# Patient Record
Sex: MALE | Race: WHITE | NOT HISPANIC OR LATINO | Employment: OTHER | URBAN - METROPOLITAN AREA
[De-identification: names, ages, dates, MRNs, and addresses within clinical notes are randomized per-mention and may not be internally consistent; named-entity substitution may affect disease eponyms.]

---

## 2017-01-01 ENCOUNTER — TELEPHONE (OUTPATIENT)
Dept: FAMILY MEDICINE | Facility: CLINIC | Age: 82
End: 2017-01-01

## 2017-01-01 ENCOUNTER — HOSPITAL ENCOUNTER (OUTPATIENT)
Dept: RADIOLOGY | Facility: HOSPITAL | Age: 82
Discharge: HOME | End: 2017-04-11
Attending: FAMILY MEDICINE
Payer: MEDICARE

## 2017-01-01 ENCOUNTER — PATIENT OUTREACH (OUTPATIENT)
Dept: ADMINISTRATIVE | Facility: HOSPITAL | Age: 82
End: 2017-01-01
Payer: MEDICARE

## 2017-01-01 ENCOUNTER — CLINICAL SUPPORT (OUTPATIENT)
Dept: FAMILY MEDICINE | Facility: CLINIC | Age: 82
End: 2017-01-01
Payer: MEDICARE

## 2017-01-01 ENCOUNTER — OFFICE VISIT (OUTPATIENT)
Dept: FAMILY MEDICINE | Facility: CLINIC | Age: 82
End: 2017-01-01
Payer: MEDICARE

## 2017-01-01 VITALS
SYSTOLIC BLOOD PRESSURE: 124 MMHG | TEMPERATURE: 97 F | DIASTOLIC BLOOD PRESSURE: 74 MMHG | BODY MASS INDEX: 18.41 KG/M2 | RESPIRATION RATE: 18 BRPM | HEIGHT: 68 IN | WEIGHT: 121.5 LBS | OXYGEN SATURATION: 97 % | HEART RATE: 84 BPM

## 2017-01-01 DIAGNOSIS — I51.89 DIASTOLIC DYSFUNCTION: ICD-10-CM

## 2017-01-01 DIAGNOSIS — I35.1 MODERATE AORTIC REGURGITATION: ICD-10-CM

## 2017-01-01 DIAGNOSIS — I35.0 MODERATE AORTIC STENOSIS: ICD-10-CM

## 2017-01-01 DIAGNOSIS — S09.90XA HEAD TRAUMA, INITIAL ENCOUNTER: Primary | ICD-10-CM

## 2017-01-01 DIAGNOSIS — Z86.73 HISTORY OF CVA (CEREBROVASCULAR ACCIDENT): ICD-10-CM

## 2017-01-01 DIAGNOSIS — Z23 NEED FOR PROPHYLACTIC VACCINATION AND INOCULATION AGAINST INFLUENZA: Primary | ICD-10-CM

## 2017-01-01 DIAGNOSIS — S09.90XA HEAD TRAUMA, INITIAL ENCOUNTER: ICD-10-CM

## 2017-01-01 DIAGNOSIS — E78.5 HYPERLIPIDEMIA, UNSPECIFIED HYPERLIPIDEMIA TYPE: ICD-10-CM

## 2017-01-01 DIAGNOSIS — I10 ESSENTIAL HYPERTENSION: ICD-10-CM

## 2017-01-01 DIAGNOSIS — R63.4 WEIGHT LOSS: Primary | ICD-10-CM

## 2017-01-01 LAB
ALBUMIN SERPL BCP-MCNC: 3.7 G/DL
ALP SERPL-CCNC: 84 U/L
ALT SERPL W/O P-5'-P-CCNC: 19 U/L
ANION GAP SERPL CALC-SCNC: 11 MMOL/L
AST SERPL-CCNC: 29 U/L
BASOPHILS # BLD AUTO: 0.01 K/UL
BASOPHILS NFR BLD: 0.1 %
BILIRUB SERPL-MCNC: 0.4 MG/DL
BUN SERPL-MCNC: 34 MG/DL
CALCIUM SERPL-MCNC: 9.5 MG/DL
CHLORIDE SERPL-SCNC: 107 MMOL/L
CO2 SERPL-SCNC: 24 MMOL/L
CREAT SERPL-MCNC: 1.3 MG/DL
DIFFERENTIAL METHOD: ABNORMAL
EOSINOPHIL # BLD AUTO: 0.1 K/UL
EOSINOPHIL NFR BLD: 2 %
ERYTHROCYTE [DISTWIDTH] IN BLOOD BY AUTOMATED COUNT: 14 %
EST. GFR  (AFRICAN AMERICAN): 53.6 ML/MIN/1.73 M^2
EST. GFR  (NON AFRICAN AMERICAN): 46.4 ML/MIN/1.73 M^2
GLUCOSE SERPL-MCNC: 86 MG/DL
HCT VFR BLD AUTO: 34.1 %
HGB BLD-MCNC: 11.4 G/DL
LYMPHOCYTES # BLD AUTO: 0.8 K/UL
LYMPHOCYTES NFR BLD: 11.5 %
MCH RBC QN AUTO: 32.1 PG
MCHC RBC AUTO-ENTMCNC: 33.4 %
MCV RBC AUTO: 96 FL
MONOCYTES # BLD AUTO: 0.7 K/UL
MONOCYTES NFR BLD: 9.5 %
NEUTROPHILS # BLD AUTO: 5.2 K/UL
NEUTROPHILS NFR BLD: 75.9 %
PLATELET # BLD AUTO: 216 K/UL
PMV BLD AUTO: 10.3 FL
POTASSIUM SERPL-SCNC: 5 MMOL/L
PREALB SERPL-MCNC: 21 MG/DL
PROT SERPL-MCNC: 6.6 G/DL
RBC # BLD AUTO: 3.55 M/UL
SODIUM SERPL-SCNC: 142 MMOL/L
TSH SERPL DL<=0.005 MIU/L-ACNC: 1.41 UIU/ML
WBC # BLD AUTO: 6.86 K/UL

## 2017-01-01 PROCEDURE — G0008 ADMIN INFLUENZA VIRUS VAC: HCPCS | Mod: S$GLB,,, | Performed by: FAMILY MEDICINE

## 2017-01-01 PROCEDURE — 85025 COMPLETE CBC W/AUTO DIFF WBC: CPT

## 2017-01-01 PROCEDURE — 84443 ASSAY THYROID STIM HORMONE: CPT

## 2017-01-01 PROCEDURE — 90662 IIV NO PRSV INCREASED AG IM: CPT | Mod: S$GLB,,, | Performed by: FAMILY MEDICINE

## 2017-01-01 PROCEDURE — 84134 ASSAY OF PREALBUMIN: CPT

## 2017-01-01 PROCEDURE — 1125F AMNT PAIN NOTED PAIN PRSNT: CPT | Mod: S$GLB,,, | Performed by: FAMILY MEDICINE

## 2017-01-01 PROCEDURE — 99213 OFFICE O/P EST LOW 20 MIN: CPT | Mod: S$GLB,,, | Performed by: FAMILY MEDICINE

## 2017-01-01 PROCEDURE — 70450 CT HEAD/BRAIN W/O DYE: CPT | Mod: TC,PO

## 2017-01-01 PROCEDURE — 80053 COMPREHEN METABOLIC PANEL: CPT

## 2017-01-01 RX ORDER — ALPRAZOLAM 0.25 MG/1
0.12 TABLET ORAL NIGHTLY PRN
Qty: 30 TABLET | Refills: 1 | Status: SHIPPED | OUTPATIENT
Start: 2017-01-01 | End: 2017-01-01 | Stop reason: SDUPTHER

## 2017-01-01 RX ORDER — ALPRAZOLAM 0.25 MG/1
TABLET ORAL
Qty: 30 TABLET | Refills: 0 | OUTPATIENT
Start: 2017-01-01

## 2017-01-01 RX ORDER — ALPRAZOLAM 0.25 MG/1
0.12 TABLET ORAL NIGHTLY PRN
Qty: 45 TABLET | Refills: 1 | Status: SHIPPED | OUTPATIENT
Start: 2017-01-01 | End: 2018-01-01 | Stop reason: SDUPTHER

## 2017-01-01 RX ORDER — PENICILLIN V POTASSIUM 500 MG/1
500 TABLET, FILM COATED ORAL 3 TIMES DAILY
Qty: 30 TABLET | Refills: 0 | Status: SHIPPED | OUTPATIENT
Start: 2017-01-01 | End: 2018-01-01

## 2017-01-01 RX ORDER — LEVOTHYROXINE SODIUM 88 UG/1
88 TABLET ORAL DAILY
Qty: 90 TABLET | Refills: 3 | Status: SHIPPED | OUTPATIENT
Start: 2017-01-01 | End: 2018-01-01 | Stop reason: SDUPTHER

## 2017-01-01 RX ORDER — SIMVASTATIN 40 MG/1
TABLET, FILM COATED ORAL
Qty: 90 TABLET | Refills: 3 | Status: SHIPPED | OUTPATIENT
Start: 2017-01-01

## 2017-01-01 RX ORDER — PENICILLIN V POTASSIUM 500 MG/1
500 TABLET, FILM COATED ORAL EVERY 8 HOURS
Qty: 30 TABLET | Refills: 0 | Status: SHIPPED | OUTPATIENT
Start: 2017-01-01 | End: 2017-01-01

## 2017-01-08 RX ORDER — IRBESARTAN 300 MG/1
300 TABLET ORAL DAILY
Qty: 90 TABLET | Refills: 3 | Status: SHIPPED | OUTPATIENT
Start: 2017-01-08 | End: 2018-01-01 | Stop reason: SDUPTHER

## 2017-01-10 ENCOUNTER — TELEPHONE (OUTPATIENT)
Dept: FAMILY MEDICINE | Facility: CLINIC | Age: 82
End: 2017-01-10

## 2017-01-10 RX ORDER — CLOPIDOGREL BISULFATE 75 MG/1
75 TABLET ORAL DAILY
Qty: 90 TABLET | Refills: 3 | Status: SHIPPED | OUTPATIENT
Start: 2017-01-10 | End: 2018-01-01

## 2017-01-13 ENCOUNTER — TELEPHONE (OUTPATIENT)
Dept: FAMILY MEDICINE | Facility: CLINIC | Age: 82
End: 2017-01-13

## 2017-01-13 DIAGNOSIS — R13.14 PHARYNGOESOPHAGEAL DYSPHAGIA: Primary | ICD-10-CM

## 2017-01-14 NOTE — TELEPHONE ENCOUNTER
Pt called me Friday PM; unable to swallow big pills suddenly and food; no vomiting, no pain;    Needs esophogram; ordered; pt told to be PARESH KELLEY Sunday PM and to call me so we can get this done Monday.  Will drink ensure and eat soft foods only until then.

## 2017-02-12 RX ORDER — DILTIAZEM HYDROCHLORIDE 120 MG/1
CAPSULE, COATED, EXTENDED RELEASE ORAL
Qty: 180 CAPSULE | Refills: 3 | Status: SHIPPED | OUTPATIENT
Start: 2017-02-12

## 2017-04-11 NOTE — TELEPHONE ENCOUNTER
Call pt at 8 AM and get for Tuesday AM ASAP so his son in law Sterling can bring him.  Pt fell Monday PM at home. I saw him at 8 pm; hematoma back of head; hit head on concrete.

## 2017-04-11 NOTE — TELEPHONE ENCOUNTER
----- Message from John Paul Sargent MD sent at 4/11/2017 10:17 AM CDT -----  i called pt and notified him it was normal; he will notify his children

## 2017-04-17 NOTE — MR AVS SNAPSHOT
[facility]  [address] [place] [address]  [place] [address]  Phone: [phone]  Fax: [phone]                  Mika Domingo   2017 11:20 AM   Office Visit    Description:  Male : 9/3/1921   Provider:  John Paul Sargent MD   Department:  [facility]           Reason for Visit     Follow-up           Diagnoses this Visit        Comments    Weight loss    -  Primary     Moderate aortic stenosis         Moderate aortic regurgitation         Diastolic dysfunction         History of CVA (cerebrovascular accident)         Hyperlipidemia, unspecified hyperlipidemia type         Essential hypertension                To Do List           Goals (5 Years of Data)     None      Follow-Up and Disposition     Return in about 6 months (around 10/17/2017).      [facility]     [facility] Nurse Care Line -  Assistance  Unless otherwise directed by your provider, please contact [facility], our nurse care line that is available for  assistance.     Registered nurses in the [facility] Center provide: appointment scheduling, clinical advisement, health education, and other advisory services.  Call: [phone] (toll free)               Medications           Message regarding Medications     Verify the changes and/or additions to your medication regime listed below are the same as discussed with your clinician today.  If any of these changes or additions are incorrect, please notify your healthcare provider.             Verify that the below list of medications is an accurate representation of the medications you are currently taking.  If none reported, the list may be blank. If incorrect, please contact your healthcare provider. Carry this list with you in case of emergency.           Current Medications     alprazolam (XANAX) 0.25 MG tablet Take 0.5 tablets (0.125 mg total) by mouth nightly as needed for Anxiety. One at night as needed for sleep    clopidogrel (PLAVIX) 75  "mg tablet Take 1 tablet (75 mg total) by mouth once daily.    diltiaZEM (CARDIZEM CD) 120 MG Cp24 TAKE 1 CAPSULE TWICE DAILY    irbesartan (AVAPRO) 300 MG tablet Take 1 tablet (300 mg total) by mouth once daily.    ISTALOL 0.5 % DrpD     latanoprost 0.005 % ophthalmic solution     levothyroxine (SYNTHROID) 88 MCG tablet TAKE 1 TABLET ONCE DAILY    ramelteon (ROZEREM) 8 mg tablet Take 1 tablet (8 mg total) by mouth every evening.    simvastatin (ZOCOR) 40 MG tablet Take 1 tablet (40 mg total) by mouth nightly.           Clinical Reference Information           Your Vitals Were     BP Pulse Temp Resp Height Weight    124/74 (BP Location: Left arm, Patient Position: Sitting, BP Method: Manual) 84 97 °F (36.1 °C) (Oral) 18 5' 8" (1.727 m) 55.1 kg (121 lb 7.6 oz)    SpO2 BMI             97% 18.47 kg/m2         Blood Pressure          Most Recent Value    BP  124/74      Allergies as of 4/17/2017     No Known Allergies      Immunizations Administered on Date of Encounter - 4/17/2017     None      Orders Placed During Today's Visit      Normal Orders This Visit    CBC auto differential     Comprehensive metabolic panel     PREALBUMIN     TSH     Future Labs/Procedures Expected by Expires    CBC auto differential  4/17/2017 4/17/2017    Comprehensive metabolic panel  4/17/2017 4/17/2017    PREALBUMIN  4/17/2017 4/17/2017    TSH  As directed 4/17/2017      [facility] Sign-Up     Activating your [facility] account is as easy as 1-2-3!     1) Visit [email or web address], select Sign Up Now, enter this activation code and your date of birth, then select Next.  [email or web address]  Expires: 6/7/2017  6:55 PM      2) Create a username and password to use when you visit [facility] in the future and select a security question in case you lose your password and select Next.    3) Enter your e-mail address and click Sign Up!    Additional Information  If you have questions, please e-mail [email or web address] or call [phone] to talk to our " [facility] staff. Remember, [facility] is NOT to be used for urgent needs. For medical emergencies, dial 911.         Language Assistance Services     ATTENTION: Language assistance services are available, free of charge. Please call [phone].      ATENCIÓN: Si habla mildred, tiene a copeland disposición servicios gratuitos de asistencia lingüística. Llame al [phone].     CHÚ Ý: N?u b?n nói Ti?ng Vi?t, có các d?ch v? h? tr? ngôn ng? mi?n phí dành cho b?n. G?i s? [phone].         [facility] complies with applicable Federal civil rights laws and does not discriminate on the basis of race, color, national origin, age, disability, or sex.

## 2017-04-17 NOTE — PROGRESS NOTES
Subjective:      Patient ID: Mika Domingo is a 89 y.o. male.    Chief Complaint: Follow-up    HPI Comments: Wellness exam; fell last week; at home with ex step son; driving still; has vision issues; trouble sleeping; losing weight since wife ;     Review of Systems   Constitutional: Negative for appetite change, fatigue, fever and unexpected weight change.   HENT: Negative for congestion, ear pain, sinus pressure and sore throat.    Eyes: Negative for pain and visual disturbance.   Respiratory: Negative for shortness of breath.    Cardiovascular: Negative for chest pain.   Gastrointestinal: Negative for abdominal pain, constipation and diarrhea.   Endocrine: Negative for polyuria.   Genitourinary: Negative for difficulty urinating and frequency.   Musculoskeletal: Positive for gait problem and neck stiffness. Negative for arthralgias, back pain and myalgias.   Skin: Negative for color change.   Allergic/Immunologic: Negative.    Neurological: Positive for weakness. Negative for syncope and headaches.   Hematological: Does not bruise/bleed easily.   Psychiatric/Behavioral: Negative for dysphoric mood and suicidal ideas. The patient is not nervous/anxious.    All other systems reviewed and are negative.    Objective:     Physical Exam   Constitutional: He is oriented to person, place, and time. He appears well-developed and well-nourished. No distress.   HENT:   Head: Normocephalic and atraumatic.   Right Ear: External ear normal.   Left Ear: External ear normal.   Mouth/Throat: Oropharynx is clear and moist. No oropharyngeal exudate.   [place]   Eyes: Conjunctivae and EOM are normal. Pupils are equal, round, and reactive to light. Right eye exhibits no discharge. Left eye exhibits no discharge. No scleral icterus.   Neck: Normal range of motion. Neck supple. No JVD present. No tracheal deviation present. No thyromegaly present.   Cardiovascular: Normal rate and regular rhythm.  Exam reveals no gallop and no friction  rub.    Murmur heard.  Pulmonary/Chest: Effort normal and breath sounds normal. No stridor. No respiratory distress. He has no wheezes. He has no rales. He exhibits no tenderness.   Abdominal: Soft. He exhibits no distension and no mass. There is no tenderness. There is no rebound and no guarding.   Musculoskeletal: He exhibits deformity. He exhibits no edema or tenderness.   scoliosis   Lymphadenopathy:     He has no cervical adenopathy.   Neurological: He is alert and oriented to person, place, and time. He has normal reflexes. He displays normal reflexes. No cranial nerve deficit. He exhibits normal muscle tone. Coordination normal.   Skin: Skin is warm and dry. No rash noted. He is not diaphoretic. No erythema. No pallor.   Psychiatric: He has a normal mood and affect. His behavior is normal. Judgment and thought content normal.   Nursing note and vitals reviewed.    Assessment:     1. Weight loss    2. Moderate aortic stenosis    3. Moderate aortic regurgitation    4. Diastolic dysfunction      Plan:     New Prescriptions    No medications on file     Discontinued Medications    No medications on file     Modified Medications    No medications on file       Weight loss  -     CBC auto differential; Future; Expected date: 4/17/17  -     Comprehensive metabolic panel; Future; Expected date: 4/17/17  -     TSH; Future  -     PREALBUMIN; Future; Expected date: 4/17/17    Moderate aortic stenosis  -     Cancel: 2D Echo w/ Color Flow Doppler    Moderate aortic regurgitation  -     Cancel: 2D Echo w/ Color Flow Doppler    Diastolic dysfunction  -     Cancel: 2D Echo w/ Color Flow Doppler

## 2017-04-23 PROBLEM — I51.89 DIASTOLIC DYSFUNCTION: Status: ACTIVE | Noted: 2017-01-01

## 2017-04-23 PROBLEM — I10 ESSENTIAL HYPERTENSION: Status: ACTIVE | Noted: 2017-01-01

## 2017-04-23 PROBLEM — R63.4 WEIGHT LOSS: Status: ACTIVE | Noted: 2017-01-01

## 2017-04-23 PROBLEM — I35.0 MODERATE AORTIC STENOSIS: Status: ACTIVE | Noted: 2017-01-01

## 2017-04-23 PROBLEM — I35.1 MODERATE AORTIC REGURGITATION: Status: ACTIVE | Noted: 2017-01-01

## 2017-06-02 NOTE — TELEPHONE ENCOUNTER
Please send to [facility] in [place]     alprazolam (XANAX) 0.25 MG tablet [Pharmacy Med Name: ALPRAZolam 0.25MG   TAB]  TAKE ONE TABLET BY MOUTH AT NIGHT AS NEEDED FOR SLEEP   Normal, Disp-30 tablet, R-0

## 2017-06-30 NOTE — TELEPHONE ENCOUNTER
----- Message from Lore Mendieta sent at 6/30/2017 11:08 AM CDT -----  Contact: Pt [phone]  Patient states he would like a call from Dr. Sargent's nurse. Patient stated he didn't want to leave any other details. Please call

## 2017-11-17 NOTE — TELEPHONE ENCOUNTER
----- Message from Sangeetha Kingsley sent at 11/17/2017  9:08 AM CST -----  Patient would like a returned call from Dr Sargent. Says he lost John Paul's cell #

## 2018-01-01 ENCOUNTER — OFFICE VISIT (OUTPATIENT)
Dept: FAMILY MEDICINE | Facility: CLINIC | Age: 83
End: 2018-01-01
Payer: MEDICARE

## 2018-01-01 ENCOUNTER — HOSPITAL ENCOUNTER (EMERGENCY)
Facility: HOSPITAL | Age: 83
Discharge: HOME | End: 2018-01-12
Attending: EMERGENCY MEDICINE
Payer: MEDICARE

## 2018-01-01 ENCOUNTER — TELEPHONE (OUTPATIENT)
Dept: FAMILY MEDICINE | Facility: CLINIC | Age: 83
End: 2018-01-01

## 2018-01-01 ENCOUNTER — HOSPITAL ENCOUNTER (OUTPATIENT)
Facility: HOSPITAL | Age: 83
End: 2018-04-03
Attending: EMERGENCY MEDICINE | Admitting: HOSPITALIST
Payer: MEDICARE

## 2018-01-01 ENCOUNTER — CLINICAL SUPPORT (OUTPATIENT)
Dept: FAMILY MEDICINE | Facility: CLINIC | Age: 83
End: 2018-01-01
Payer: MEDICARE

## 2018-01-01 ENCOUNTER — HOSPITAL ENCOUNTER (EMERGENCY)
Facility: HOSPITAL | Age: 83
Discharge: HOME | End: 2018-03-21
Payer: MEDICARE

## 2018-01-01 VITALS
TEMPERATURE: 98 F | DIASTOLIC BLOOD PRESSURE: 59 MMHG | SYSTOLIC BLOOD PRESSURE: 132 MMHG | HEART RATE: 93 BPM | WEIGHT: 124.69 LBS | BODY MASS INDEX: 18.9 KG/M2 | OXYGEN SATURATION: 99 % | HEIGHT: 68 IN | RESPIRATION RATE: 16 BRPM

## 2018-01-01 VITALS
BODY MASS INDEX: 23.23 KG/M2 | DIASTOLIC BLOOD PRESSURE: 71 MMHG | RESPIRATION RATE: 19 BRPM | WEIGHT: 148 LBS | OXYGEN SATURATION: 96 % | SYSTOLIC BLOOD PRESSURE: 143 MMHG | HEART RATE: 95 BPM | TEMPERATURE: 98 F | HEIGHT: 67 IN

## 2018-01-01 VITALS
RESPIRATION RATE: 18 BRPM | WEIGHT: 135 LBS | HEART RATE: 70 BPM | DIASTOLIC BLOOD PRESSURE: 60 MMHG | TEMPERATURE: 98 F | SYSTOLIC BLOOD PRESSURE: 138 MMHG | BODY MASS INDEX: 21.14 KG/M2 | OXYGEN SATURATION: 99 %

## 2018-01-01 VITALS
BODY MASS INDEX: 20.22 KG/M2 | SYSTOLIC BLOOD PRESSURE: 94 MMHG | OXYGEN SATURATION: 98 % | DIASTOLIC BLOOD PRESSURE: 60 MMHG | HEART RATE: 67 BPM | HEIGHT: 67 IN | TEMPERATURE: 98 F | WEIGHT: 128.81 LBS

## 2018-01-01 DIAGNOSIS — R53.83 FATIGUE: ICD-10-CM

## 2018-01-01 DIAGNOSIS — I51.89 DIASTOLIC DYSFUNCTION: ICD-10-CM

## 2018-01-01 DIAGNOSIS — I35.0 MODERATE AORTIC STENOSIS: ICD-10-CM

## 2018-01-01 DIAGNOSIS — D64.9 ANEMIA, UNSPECIFIED TYPE: ICD-10-CM

## 2018-01-01 DIAGNOSIS — I95.2 HYPOTENSION DUE TO DRUGS: ICD-10-CM

## 2018-01-01 DIAGNOSIS — I10 ESSENTIAL HYPERTENSION: ICD-10-CM

## 2018-01-01 DIAGNOSIS — K92.2 LOWER GI BLEED: ICD-10-CM

## 2018-01-01 DIAGNOSIS — D53.9 MACROCYTIC ANEMIA: ICD-10-CM

## 2018-01-01 DIAGNOSIS — S01.01XD LACERATION OF SCALP, SUBSEQUENT ENCOUNTER: Primary | ICD-10-CM

## 2018-01-01 DIAGNOSIS — S01.03XA PUNCTURE WOUND OF SCALP, INITIAL ENCOUNTER: Primary | ICD-10-CM

## 2018-01-01 DIAGNOSIS — E86.0 DEHYDRATION: ICD-10-CM

## 2018-01-01 DIAGNOSIS — R52 PAIN: ICD-10-CM

## 2018-01-01 DIAGNOSIS — R09.02 HYPOXIA: Primary | ICD-10-CM

## 2018-01-01 DIAGNOSIS — E53.8 B12 DEFICIENCY: Primary | ICD-10-CM

## 2018-01-01 DIAGNOSIS — R79.9 ELEVATED BUN: ICD-10-CM

## 2018-01-01 DIAGNOSIS — D53.9 MACROCYTIC ANEMIA: Primary | ICD-10-CM

## 2018-01-01 DIAGNOSIS — R10.31 RLQ DISCOMFORT: Primary | ICD-10-CM

## 2018-01-01 DIAGNOSIS — N17.9 AKI (ACUTE KIDNEY INJURY): ICD-10-CM

## 2018-01-01 LAB
ABO + RH BLD: NORMAL
ALBUMIN SERPL BCP-MCNC: 3.3 G/DL
ALBUMIN SERPL BCP-MCNC: 3.4 G/DL
ALP SERPL-CCNC: 83 U/L
ALP SERPL-CCNC: 91 U/L
ALT SERPL W/O P-5'-P-CCNC: 13 U/L
ALT SERPL W/O P-5'-P-CCNC: 18 U/L
ANION GAP SERPL CALC-SCNC: 11 MMOL/L
ANION GAP SERPL CALC-SCNC: 12 MMOL/L
ANION GAP SERPL CALC-SCNC: 6 MMOL/L
ANISOCYTOSIS BLD QL SMEAR: SLIGHT
AST SERPL-CCNC: 23 U/L
AST SERPL-CCNC: 28 U/L
BASOPHILS # BLD AUTO: 0.02 K/UL
BASOPHILS NFR BLD: 0 %
BASOPHILS NFR BLD: 0.3 %
BILIRUB SERPL-MCNC: 0.3 MG/DL
BILIRUB SERPL-MCNC: 0.4 MG/DL
BILIRUB UR QL STRIP: NEGATIVE
BLD GP AB SCN CELLS X3 SERPL QL: NORMAL
BNP SERPL-MCNC: 400 PG/ML
BUN SERPL-MCNC: 32 MG/DL
BUN SERPL-MCNC: 41 MG/DL
BUN SERPL-MCNC: 54 MG/DL
CALCIUM SERPL-MCNC: 8.8 MG/DL
CALCIUM SERPL-MCNC: 9 MG/DL
CALCIUM SERPL-MCNC: 9.5 MG/DL
CHLORIDE SERPL-SCNC: 109 MMOL/L
CHLORIDE SERPL-SCNC: 110 MMOL/L
CHLORIDE SERPL-SCNC: 111 MMOL/L
CLARITY UR REFRACT.AUTO: CLEAR
CO2 SERPL-SCNC: 21 MMOL/L
CO2 SERPL-SCNC: 21 MMOL/L
CO2 SERPL-SCNC: 27 MMOL/L
COLOR UR AUTO: YELLOW
CREAT SERPL-MCNC: 1.4 MG/DL
CREAT SERPL-MCNC: 1.8 MG/DL
CREAT SERPL-MCNC: 2.1 MG/DL
DIFFERENTIAL METHOD: ABNORMAL
DIFFERENTIAL METHOD: ABNORMAL
EOSINOPHIL # BLD AUTO: 0.3 K/UL
EOSINOPHIL NFR BLD: 0 %
EOSINOPHIL NFR BLD: 4.4 %
ERYTHROCYTE [DISTWIDTH] IN BLOOD BY AUTOMATED COUNT: 15.8 %
ERYTHROCYTE [DISTWIDTH] IN BLOOD BY AUTOMATED COUNT: 16.2 %
ERYTHROCYTE [DISTWIDTH] IN BLOOD BY AUTOMATED COUNT: 16.6 %
EST. GFR  (AFRICAN AMERICAN): 30 ML/MIN/1.73 M^2
EST. GFR  (AFRICAN AMERICAN): 36 ML/MIN/1.73 M^2
EST. GFR  (AFRICAN AMERICAN): 48.7 ML/MIN/1.73 M^2
EST. GFR  (NON AFRICAN AMERICAN): 26 ML/MIN/1.73 M^2
EST. GFR  (NON AFRICAN AMERICAN): 31 ML/MIN/1.73 M^2
EST. GFR  (NON AFRICAN AMERICAN): 42.1 ML/MIN/1.73 M^2
FERRITIN SERPL-MCNC: 58 NG/ML
FERRITIN SERPL-MCNC: 71 NG/ML
FOLATE SERPL-MCNC: 14.2 NG/ML
GLUCOSE SERPL-MCNC: 111 MG/DL
GLUCOSE SERPL-MCNC: 54 MG/DL
GLUCOSE SERPL-MCNC: 91 MG/DL
GLUCOSE UR QL STRIP: NEGATIVE
HCT VFR BLD AUTO: 29.1 %
HCT VFR BLD AUTO: 30.3 %
HCT VFR BLD AUTO: 31.8 %
HGB BLD-MCNC: 8.7 G/DL
HGB BLD-MCNC: 9.5 G/DL
HGB BLD-MCNC: 9.9 G/DL
HGB UR QL STRIP: NEGATIVE
HYPOCHROMIA BLD QL SMEAR: ABNORMAL
IMM GRANULOCYTES # BLD AUTO: 0.12 K/UL
IMM GRANULOCYTES NFR BLD AUTO: 1.7 %
INR PPP: 1
IRON SERPL-MCNC: 20 UG/DL
IRON SERPL-MCNC: 53 UG/DL
KETONES UR QL STRIP: NEGATIVE
LEUKOCYTE ESTERASE UR QL STRIP: NEGATIVE
LYMPHOCYTES # BLD AUTO: 0.7 K/UL
LYMPHOCYTES NFR BLD: 9 %
LYMPHOCYTES NFR BLD: 9.4 %
MCH RBC QN AUTO: 30.5 PG
MCH RBC QN AUTO: 30.6 PG
MCH RBC QN AUTO: 31.1 PG
MCHC RBC AUTO-ENTMCNC: 29.9 G/DL
MCHC RBC AUTO-ENTMCNC: 31.1 G/DL
MCHC RBC AUTO-ENTMCNC: 31.4 G/DL
MCV RBC AUTO: 104 FL
MCV RBC AUTO: 97 FL
MCV RBC AUTO: 98 FL
METAMYELOCYTES NFR BLD MANUAL: 3 %
MONOCYTES # BLD AUTO: 0.6 K/UL
MONOCYTES NFR BLD: 2 %
MONOCYTES NFR BLD: 8 %
MYELOCYTES NFR BLD MANUAL: 2 %
NEUTROPHILS # BLD AUTO: 5.4 K/UL
NEUTROPHILS NFR BLD: 76.2 %
NEUTROPHILS NFR BLD: 84 %
NITRITE UR QL STRIP: NEGATIVE
NRBC BLD-RTO: 0 /100 WBC
OB PNL STL: POSITIVE
OVALOCYTES BLD QL SMEAR: ABNORMAL
PH UR STRIP: 6 [PH] (ref 5–8)
PLATELET # BLD AUTO: 196 K/UL
PLATELET # BLD AUTO: 209 K/UL
PLATELET # BLD AUTO: 228 K/UL
PLATELET BLD QL SMEAR: ABNORMAL
PMV BLD AUTO: 10.2 FL
PMV BLD AUTO: 9.5 FL
PMV BLD AUTO: 9.9 FL
POIKILOCYTOSIS BLD QL SMEAR: SLIGHT
POLYCHROMASIA BLD QL SMEAR: ABNORMAL
POTASSIUM SERPL-SCNC: 5 MMOL/L
POTASSIUM SERPL-SCNC: 5.2 MMOL/L
POTASSIUM SERPL-SCNC: 5.2 MMOL/L
PROT SERPL-MCNC: 6 G/DL
PROT SERPL-MCNC: 6 G/DL
PROT UR QL STRIP: NEGATIVE
PROTHROMBIN TIME: 10.6 SEC
RBC # BLD AUTO: 2.8 M/UL
RBC # BLD AUTO: 3.11 M/UL
RBC # BLD AUTO: 3.24 M/UL
RETICS/RBC NFR AUTO: 1.8 %
SATURATED IRON: 7 %
SODIUM SERPL-SCNC: 142 MMOL/L
SODIUM SERPL-SCNC: 142 MMOL/L
SODIUM SERPL-SCNC: 144 MMOL/L
SP GR UR STRIP: 1.01 (ref 1–1.03)
T4 FREE SERPL-MCNC: 0.79 NG/DL
TOTAL IRON BINDING CAPACITY: 299 UG/DL
TRANSFERRIN SERPL-MCNC: 202 MG/DL
TROPONIN I SERPL DL<=0.01 NG/ML-MCNC: 0.03 NG/ML
TSH SERPL DL<=0.005 MIU/L-ACNC: 4.17 UIU/ML
URN SPEC COLLECT METH UR: NORMAL
UROBILINOGEN UR STRIP-ACNC: NEGATIVE EU/DL
VIT B12 SERPL-MCNC: 181 PG/ML
WBC # BLD AUTO: 7.02 K/UL
WBC # BLD AUTO: 7.53 K/UL
WBC # BLD AUTO: 7.82 K/UL

## 2018-01-01 PROCEDURE — 83540 ASSAY OF IRON: CPT

## 2018-01-01 PROCEDURE — 99284 EMERGENCY DEPT VISIT MOD MDM: CPT

## 2018-01-01 PROCEDURE — C9113 INJ PANTOPRAZOLE SODIUM, VIA: HCPCS | Performed by: PHYSICIAN ASSISTANT

## 2018-01-01 PROCEDURE — 82607 VITAMIN B-12: CPT

## 2018-01-01 PROCEDURE — 82272 OCCULT BLD FECES 1-3 TESTS: CPT

## 2018-01-01 PROCEDURE — 25000003 PHARM REV CODE 250: Performed by: HOSPITALIST

## 2018-01-01 PROCEDURE — G0378 HOSPITAL OBSERVATION PER HR: HCPCS

## 2018-01-01 PROCEDURE — 99284 EMERGENCY DEPT VISIT MOD MDM: CPT | Mod: 25

## 2018-01-01 PROCEDURE — 84484 ASSAY OF TROPONIN QUANT: CPT

## 2018-01-01 PROCEDURE — 82728 ASSAY OF FERRITIN: CPT

## 2018-01-01 PROCEDURE — 99213 OFFICE O/P EST LOW 20 MIN: CPT | Mod: S$GLB,,, | Performed by: FAMILY MEDICINE

## 2018-01-01 PROCEDURE — 85027 COMPLETE CBC AUTOMATED: CPT | Mod: 59

## 2018-01-01 PROCEDURE — 85610 PROTHROMBIN TIME: CPT

## 2018-01-01 PROCEDURE — 36415 COLL VENOUS BLD VENIPUNCTURE: CPT

## 2018-01-01 PROCEDURE — 81003 URINALYSIS AUTO W/O SCOPE: CPT

## 2018-01-01 PROCEDURE — 82746 ASSAY OF FOLIC ACID SERUM: CPT

## 2018-01-01 PROCEDURE — 96361 HYDRATE IV INFUSION ADD-ON: CPT

## 2018-01-01 PROCEDURE — 25000003 PHARM REV CODE 250: Performed by: EMERGENCY MEDICINE

## 2018-01-01 PROCEDURE — 80048 BASIC METABOLIC PNL TOTAL CA: CPT

## 2018-01-01 PROCEDURE — 84443 ASSAY THYROID STIM HORMONE: CPT

## 2018-01-01 PROCEDURE — 93010 ELECTROCARDIOGRAM REPORT: CPT | Mod: ,,, | Performed by: INTERNAL MEDICINE

## 2018-01-01 PROCEDURE — 12001 RPR S/N/AX/GEN/TRNK 2.5CM/<: CPT

## 2018-01-01 PROCEDURE — 86850 RBC ANTIBODY SCREEN: CPT

## 2018-01-01 PROCEDURE — 85025 COMPLETE CBC W/AUTO DIFF WBC: CPT

## 2018-01-01 PROCEDURE — 83880 ASSAY OF NATRIURETIC PEPTIDE: CPT

## 2018-01-01 PROCEDURE — 85045 AUTOMATED RETICULOCYTE COUNT: CPT

## 2018-01-01 PROCEDURE — 96360 HYDRATION IV INFUSION INIT: CPT

## 2018-01-01 PROCEDURE — 80053 COMPREHEN METABOLIC PANEL: CPT

## 2018-01-01 PROCEDURE — 93005 ELECTROCARDIOGRAM TRACING: CPT

## 2018-01-01 PROCEDURE — 84439 ASSAY OF FREE THYROXINE: CPT

## 2018-01-01 PROCEDURE — 99285 EMERGENCY DEPT VISIT HI MDM: CPT | Mod: 25

## 2018-01-01 PROCEDURE — 63600175 PHARM REV CODE 636 W HCPCS: Performed by: PHYSICIAN ASSISTANT

## 2018-01-01 RX ORDER — ACETAMINOPHEN 325 MG/1
650 TABLET ORAL EVERY 4 HOURS PRN
Status: DISCONTINUED | OUTPATIENT
Start: 2018-01-01 | End: 2018-01-01 | Stop reason: HOSPADM

## 2018-01-01 RX ORDER — RAMELTEON 8 MG/1
8 TABLET ORAL NIGHTLY PRN
Status: DISCONTINUED | OUTPATIENT
Start: 2018-01-01 | End: 2018-01-01 | Stop reason: HOSPADM

## 2018-01-01 RX ORDER — SODIUM CHLORIDE 0.9 % (FLUSH) 0.9 %
5 SYRINGE (ML) INJECTION
Status: DISCONTINUED | OUTPATIENT
Start: 2018-01-01 | End: 2018-01-01 | Stop reason: HOSPADM

## 2018-01-01 RX ORDER — ONDANSETRON 8 MG/1
8 TABLET, ORALLY DISINTEGRATING ORAL EVERY 8 HOURS PRN
Status: DISCONTINUED | OUTPATIENT
Start: 2018-01-01 | End: 2018-01-01 | Stop reason: HOSPADM

## 2018-01-01 RX ORDER — LEVOTHYROXINE SODIUM 88 UG/1
88 TABLET ORAL
Status: DISCONTINUED | OUTPATIENT
Start: 2018-01-01 | End: 2018-01-01 | Stop reason: HOSPADM

## 2018-01-01 RX ORDER — LEVOTHYROXINE SODIUM 88 UG/1
88 TABLET ORAL DAILY
Qty: 90 TABLET | Refills: 3 | Status: SHIPPED | OUTPATIENT
Start: 2018-01-01 | End: 2018-04-14

## 2018-01-01 RX ORDER — IRBESARTAN 300 MG/1
TABLET ORAL
Qty: 90 TABLET | Refills: 3 | Status: SHIPPED | OUTPATIENT
Start: 2018-01-01 | End: 2018-01-01 | Stop reason: SDUPTHER

## 2018-01-01 RX ORDER — CYANOCOBALAMIN 1000 UG/ML
INJECTION, SOLUTION INTRAMUSCULAR; SUBCUTANEOUS
Qty: 10 ML | Refills: 1 | Status: SHIPPED | OUTPATIENT
Start: 2018-01-01

## 2018-01-01 RX ORDER — HYDROCORTISONE VALERATE 2 MG/G
OINTMENT TOPICAL 2 TIMES DAILY
Qty: 60 G | Refills: 1 | Status: SHIPPED | OUTPATIENT
Start: 2018-01-01 | End: 2018-01-01

## 2018-01-01 RX ORDER — DILTIAZEM HYDROCHLORIDE 120 MG/1
120 CAPSULE, COATED, EXTENDED RELEASE ORAL 2 TIMES DAILY
Status: DISCONTINUED | OUTPATIENT
Start: 2018-01-01 | End: 2018-01-01 | Stop reason: HOSPADM

## 2018-01-01 RX ORDER — SIMVASTATIN 20 MG/1
40 TABLET, FILM COATED ORAL NIGHTLY
Status: DISCONTINUED | OUTPATIENT
Start: 2018-01-01 | End: 2018-01-01 | Stop reason: HOSPADM

## 2018-01-01 RX ORDER — ALPRAZOLAM 0.25 MG/1
0.12 TABLET ORAL NIGHTLY PRN
Qty: 45 TABLET | Refills: 1 | Status: SHIPPED | OUTPATIENT
Start: 2018-01-01

## 2018-01-01 RX ORDER — PROCHLORPERAZINE EDISYLATE 5 MG/ML
5 INJECTION INTRAMUSCULAR; INTRAVENOUS EVERY 6 HOURS PRN
Status: DISCONTINUED | OUTPATIENT
Start: 2018-01-01 | End: 2018-01-01 | Stop reason: HOSPADM

## 2018-01-01 RX ORDER — IRBESARTAN 300 MG/1
150 TABLET ORAL DAILY
Qty: 90 TABLET | Refills: 3
Start: 2018-01-01

## 2018-01-01 RX ADMIN — PANTOPRAZOLE SODIUM 80 MG: 40 INJECTION, POWDER, FOR SOLUTION INTRAVENOUS at 11:04

## 2018-01-01 RX ADMIN — LEVOTHYROXINE SODIUM 88 MCG: 0.09 TABLET ORAL at 06:04

## 2018-01-01 RX ADMIN — DILTIAZEM HYDROCHLORIDE 120 MG: 120 CAPSULE, COATED, EXTENDED RELEASE ORAL at 11:04

## 2018-01-01 RX ADMIN — SIMVASTATIN 40 MG: 20 TABLET, FILM COATED ORAL at 11:04

## 2018-01-01 RX ADMIN — RAMELTEON 8 MG: 8 TABLET, FILM COATED ORAL at 11:04

## 2018-01-01 RX ADMIN — SODIUM CHLORIDE 1000 ML: 0.9 INJECTION, SOLUTION INTRAVENOUS at 03:04

## 2018-01-12 NOTE — ED PROVIDER NOTES
Encounter Date: 1/12/2018       History     Chief Complaint   Patient presents with    Abdominal Pain     Pt states when he woke up at 0400 felt a sharp pain to right lower suprapubic area.  Pt states last BM this am, states normal for self, denies pain with urination.  Denies n/v.       The history is provided by the patient.   Abdominal Pain   The current episode started 1 to 2 hours ago. The onset of the illness was abrupt. The problem has not changed since onset.The abdominal pain is located in the RLQ and LLQ. The abdominal pain does not radiate. The severity of the abdominal pain is 5/10. The abdominal pain is relieved by nothing. The other symptoms of the illness do not include fever, jaundice, melena, nausea, vomiting, diarrhea, dysuria, hematemesis or hematochezia.   The patient has not had a change in bowel habit. Symptoms associated with the illness do not include chills, anorexia, diaphoresis, heartburn, constipation, urgency, hematuria, frequency or back pain.     Review of patient's allergies indicates:  No Known Allergies  Past Medical History:   Diagnosis Date    Hypertension     Thyroid disease      Past Surgical History:   Procedure Laterality Date    TONSILLECTOMY       Family History   Problem Relation Age of Onset    No Known Problems Mother     No Known Problems Father      Social History   Substance Use Topics    Smoking status: Former Smoker     Types: Cigarettes     Quit date: 9/1/1966    Smokeless tobacco: Never Used    Alcohol use 1.2 oz/week     2 Glasses of wine per week     Review of Systems   Constitutional: Negative for chills, diaphoresis and fever.   Gastrointestinal: Positive for abdominal pain. Negative for anorexia, constipation, diarrhea, heartburn, hematemesis, hematochezia, jaundice, melena, nausea and vomiting.   Genitourinary: Negative for dysuria, frequency, hematuria and urgency.   Musculoskeletal: Negative for back pain.   All other systems reviewed and are  negative.      Physical Exam     Initial Vitals [01/12/18 0712]   BP Pulse Resp Temp SpO2   (!) 105/52 93 20 98.4 °F (36.9 °C) 97 %      MAP       69.67         Physical Exam    Nursing note and vitals reviewed.  Constitutional: He appears well-developed and well-nourished.   HENT:   Head: Normocephalic and atraumatic.   Eyes: EOM are normal.   Neck: Normal range of motion. Neck supple.   Cardiovascular: Normal rate, regular rhythm, normal heart sounds and intact distal pulses.   Pulmonary/Chest: Breath sounds normal.   Abdominal: Soft. There is no tenderness. There is no rigidity, no rebound, no guarding, no CVA tenderness, no tenderness at McBurney's point and negative Bergeron's sign.       Musculoskeletal: Normal range of motion.   Neurological: He is alert and oriented to person, place, and time.   Skin: Skin is warm and dry. Capillary refill takes less than 2 seconds.   Psychiatric: He has a normal mood and affect. Judgment and thought content normal.         ED Course   Procedures  Labs Reviewed - No data to display          Medical Decision Making:   Clinical Tests:   Lab Tests: Ordered and Reviewed  Radiological Study: Ordered and Reviewed                   ED Course      Clinical Impression:   The primary encounter diagnosis was RLQ discomfort. A diagnosis of Pain was also pertinent to this visit.    Disposition:   Disposition: Discharged  Condition: Stable                        Marilyn Noriega MD  01/12/18 1007

## 2018-01-12 NOTE — ED TRIAGE NOTES
Pt presents to ED c/o RLQ abdominal pain that began approximately 7 hours ago. Woke pt up from his sleep. Reports it was initially a dull pain but has since increased. Pt mildly tender to area. Last BM this am-normal. Denies nausea. No fever. Denies all other symptoms.

## 2018-02-23 NOTE — TELEPHONE ENCOUNTER
PenVK 500 mg 1 po TID for 30 days with 1 refill   [facility]ed to [facility]  Per dr toscano and pt notified

## 2018-03-21 NOTE — ED PROVIDER NOTES
Encounter Date: 3/21/2018       History     Chief Complaint   Patient presents with    Puncture Wound     puncture wound  to forehead with bleeding present, dressing apllied in triage     89-year-old male presents to ED with small puncture wound to forehead with moderate bleeding.  Patient denies loss of consciousness, headache, blurred vision, nausea, neck pain, or any other associated symptoms.  Family member at bedside cannot confirm negative loss of consciousness.  Patient currently takes Plavix.          Review of patient's allergies indicates:  No Known Allergies  Past Medical History:   Diagnosis Date    Hypertension     Thyroid disease      Past Surgical History:   Procedure Laterality Date    TONSILLECTOMY       Family History   Problem Relation Age of Onset    No Known Problems Mother     No Known Problems Father      Social History   Substance Use Topics    Smoking status: Former Smoker     Types: Cigarettes     Quit date: 9/1/1966    Smokeless tobacco: Never Used    Alcohol use 1.2 oz/week     2 Glasses of wine per week     Review of Systems   Constitutional: Negative.  Negative for chills and fever.   HENT: Negative.  Negative for facial swelling and sore throat.         Small puncture wound forehead   Eyes: Negative.  Negative for photophobia and itching.   Respiratory: Negative.  Negative for shortness of breath.    Cardiovascular: Negative.  Negative for chest pain.   Gastrointestinal: Negative.  Negative for anal bleeding and nausea.   Endocrine: Negative.    Genitourinary: Negative.  Negative for discharge, dysuria and frequency.   Musculoskeletal: Negative.  Negative for back pain and myalgias.   Skin: Positive for wound. Negative for rash.   Allergic/Immunologic: Negative.    Neurological: Negative.  Negative for dizziness, tremors, seizures, syncope, speech difficulty, weakness, light-headedness and headaches.   Hematological: Negative.  Does not bruise/bleed easily.    Psychiatric/Behavioral: Negative.  Negative for confusion. The patient is not nervous/anxious.        Physical Exam     Initial Vitals [03/21/18 1124]   BP Pulse Resp Temp SpO2   (!) 142/58 75 20 97.5 °F (36.4 °C) 98 %      MAP       86         Physical Exam    Nursing note and vitals reviewed.  Constitutional: Vital signs are normal. He appears well-developed and well-nourished.  Non-toxic appearance. No distress.   HENT:   Head: Normocephalic and atraumatic. Head is without abrasion, without contusion, without laceration, without right periorbital erythema and without left periorbital erythema.       Right Ear: Hearing normal.   Left Ear: Hearing normal.   Nose: Nose normal.   Small pin point puncture wound noted with moderate bleeding.     Eyes: Conjunctivae, EOM and lids are normal.   Neck: Trachea normal, normal range of motion and full passive range of motion without pain. Neck supple. No spinous process tenderness and no muscular tenderness present. No edema, no erythema and normal range of motion present. No neck rigidity.   Cardiovascular: Normal rate, regular rhythm, S1 normal, S2 normal and normal heart sounds.   Pulmonary/Chest: Effort normal and breath sounds normal. He has no decreased breath sounds. He has no wheezes.   Abdominal: Soft. Normal appearance. There is no tenderness.   Neurological: He is alert and oriented to person, place, and time. He has normal strength. No cranial nerve deficit or sensory deficit. GCS eye subscore is 4. GCS verbal subscore is 5. GCS motor subscore is 6.   Skin: Skin is warm. Capillary refill takes less than 2 seconds.         ED Course   [facility]  Date/Time: 3/21/2018 12:58 PM  Performed by: STEFANIE GE.  Authorized by: STEFANIE GE.   Consent Done: Not Needed  Body area: head/neck  Location details: scalp  Laceration length: 0.5 cm  Foreign bodies: no foreign bodies  Tendon involvement: none  Nerve involvement: none  Vascular damage: no  Anesthesia: local  infiltration    Anesthesia:  Local Anesthetic: lidocaine 1% without epinephrine  Anesthetic total: 1 mL  Patient sedated: no  Preparation: Patient was prepped and draped in the usual sterile fashion.  Irrigation solution: saline  Amount of cleaning: standard  Skin closure: 5-0 nylon  Number of sutures: 1  Technique: simple  Approximation: close  Approximation difficulty: simple  Dressing: pressure dressing  Patient tolerance: Patient tolerated the procedure well with no immediate complications        Labs Reviewed - No data to display          Medical Decision Making:   Initial Assessment:   89-year-old male presents to ED with small puncture wound to forehead with moderate bleeding.  Patient denies loss of consciousness, headache, blurred vision, nausea, neck pain, or any other associated symptoms.  Family member at bedside cannot confirm negative loss of consciousness.  Patient currently takes Plavix.  Neuro checks grossly intact.    Differential Diagnosis:   Puncture wound  Laceration  Subarachnoid bleed  Skull fracture  Intracranial bleeding  Clinical Tests:   Radiological Study: Ordered and Reviewed  ED Management:  CT imaging unremarkable.  Wed Mar 21, 2018  1203 89-year-old male presents daily with small puncture wound to forehead secondary to striking head against corner cabinet.  Denies loss of consciousness, headache blurred vision and disequilibrium.  89-year-old male on Plavix.  Neuro intact but family member can not confirm LOC.    (LG)  1255 No distress noted patient well appearing.  Neurochecks grossly intact.  Small puncture wound with continual bleeding and closed with one 5-0 suture.  Primary care follow-up in 1-2 days.  Return precautions discussed and patient and family in agreement with plan of care.  Wound care discussed-keep area clean and dry and apply neosporin.    (LG)    Other:   I discussed test(s) with the performing physician.                   ED Course as of Mar 21 1258   Wed Mar 21,  2018   1203 89-year-old male presents daily with small puncture wound to forehead secondary to striking head against corner cabinet.  Denies loss of consciousness, headache blurred vision and disequilibrium.  89-year-old male on Plavix.  Neuro intact but family member can not confirm LOC.    [LG]   1255 No distress noted patient well appearing.  Neurochecks grossly intact.  Small puncture wound with continual bleeding and closed with one 5-0 suture.  Primary care follow-up in 1-2 days.  Return precautions discussed and patient and family in agreement with plan of care.  Wound care discussed-keep area clean and dry and apply neosporin.    [LG]      ED Course User Index  [LG] Becca Potter NP     Clinical Impression:   The encounter diagnosis was Puncture wound of scalp, initial encounter.    Disposition:   Disposition: Discharged  Condition: Stable                        Becca Potter NP  04/06/18 1151

## 2018-03-21 NOTE — DISCHARGE INSTRUCTIONS
Keep area clean and dry.  Apply Neosporin daily.  Sutures be removed in 7 days.  Return for any signs of infection including redness or swelling.  Return for any worsening of symptoms or complications including recurrent headache, dizziness, projectile vomiting, vision changes, fever, yellow drainage from wound, or any other issues.

## 2018-03-28 PROBLEM — I95.2 HYPOTENSION DUE TO DRUGS: Status: ACTIVE | Noted: 2018-01-01

## 2018-03-28 NOTE — PROGRESS NOTES
Subjective:      Patient ID: Mika Domingo is a 89 y.o. male.    Chief Complaint: Suture / Staple Removal    Scalp lac folow up; bp low today; has AS;       Suture / Staple Removal       Review of Systems   Constitutional: Positive for fatigue and unexpected weight change. Negative for appetite change and fever.   HENT: Negative for congestion, ear pain, sinus pressure and sore throat.    Eyes: Negative for pain and visual disturbance.   Respiratory: Negative for shortness of breath.    Cardiovascular: Negative for chest pain.   Gastrointestinal: Negative for abdominal pain, constipation and diarrhea.   Endocrine: Negative for polyuria.   Genitourinary: Negative for difficulty urinating and frequency.   Musculoskeletal: Negative for arthralgias, back pain and myalgias.   Skin: Negative for color change.   Allergic/Immunologic: Negative.    Neurological: Positive for dizziness and weakness. Negative for syncope and headaches.   Hematological: Does not bruise/bleed easily.   Psychiatric/Behavioral: Negative for dysphoric mood and suicidal ideas. The patient is not nervous/anxious.    All other systems reviewed and are negative.    Objective:     Physical Exam   Constitutional: He is oriented to person, place, and time. He appears well-developed and well-nourished. No distress.   HENT:   Head: Normocephalic and atraumatic.   Right Ear: External ear normal.   Left Ear: External ear normal.   Mouth/Throat: Oropharynx is clear and moist. No oropharyngeal exudate.   Eyes: Conjunctivae and EOM are normal. Pupils are equal, round, and reactive to light. Right eye exhibits no discharge. Left eye exhibits no discharge. No scleral icterus.   Neck: Normal range of motion. Neck supple. No JVD present. No tracheal deviation present. No thyromegaly present.   Cardiovascular: Normal rate and regular rhythm.  Exam reveals no gallop and no friction rub.    Murmur heard.  Pulmonary/Chest: Effort normal and breath sounds normal. No  stridor. No respiratory distress. He has no wheezes. He has no rales. He exhibits no tenderness.   Abdominal: Soft. He exhibits no distension and no mass. There is no tenderness. There is no rebound and no guarding.   Musculoskeletal: Normal range of motion. He exhibits no edema or tenderness.   Lymphadenopathy:     He has no cervical adenopathy.   Neurological: He is alert and oriented to person, place, and time. He has normal reflexes. He displays normal reflexes. No cranial nerve deficit. He exhibits normal muscle tone. Coordination normal.   Skin: Skin is warm and dry. No rash noted. He is not diaphoretic. No erythema. No pallor.   One sutured removed frontal    Psychiatric: He has a normal mood and affect. His behavior is normal. Judgment and thought content normal.   Nursing note and vitals reviewed.    Assessment:     1. Laceration of scalp, subsequent encounter    2. Moderate aortic stenosis    3. Essential hypertension    4. Hypotension due to drugs      Plan:     New Prescriptions    CYANOCOBALAMIN (VITAMIN B-12) 1,000 MCG/ML INJECTION    1 cc IM weekly for 4 weeks, then monthly     Discontinued Medications    No medications on file     Modified Medications    Modified Medication Previous Medication    IRBESARTAN (AVAPRO) 300 MG TABLET irbesartan (AVAPRO) 300 MG tablet       Take 0.5 tablets (150 mg total) by mouth once daily.    TAKE 1 TABLET ONCE DAILY       Laceration of scalp, subsequent encounter  -     CBC auto differential; Future; Expected date: 03/28/2018  -     Comprehensive metabolic panel; Future; Expected date: 03/28/2018  -     TSH    Moderate aortic stenosis  -     CBC auto differential; Future; Expected date: 03/28/2018  -     Comprehensive metabolic panel; Future; Expected date: 03/28/2018  -     TSH    Essential hypertension  -     CBC auto differential; Future; Expected date: 03/28/2018  -     Comprehensive metabolic panel; Future; Expected date: 03/28/2018  -     TSH    Hypotension due  to drugs  -     CBC auto differential; Future; Expected date: 03/28/2018  -     Comprehensive metabolic panel; Future; Expected date: 03/28/2018  -     TSH    Other orders  -     irbesartan (AVAPRO) 300 MG tablet; Take 0.5 tablets (150 mg total) by mouth once daily.  Dispense: 90 tablet; Refill: 3  -     T4, free

## 2018-03-30 PROBLEM — E53.8 B12 DEFICIENCY: Status: ACTIVE | Noted: 2018-01-01

## 2018-04-02 PROBLEM — E03.9 HYPOTHYROIDISM: Chronic | Status: ACTIVE | Noted: 2018-01-01

## 2018-04-02 PROBLEM — I10 ESSENTIAL HYPERTENSION: Chronic | Status: ACTIVE | Noted: 2017-01-01

## 2018-04-02 PROBLEM — D64.9 ANEMIA: Chronic | Status: ACTIVE | Noted: 2018-01-01

## 2018-04-02 PROBLEM — I51.89 DIASTOLIC DYSFUNCTION: Chronic | Status: ACTIVE | Noted: 2017-01-01

## 2018-04-02 PROBLEM — N17.9 AKI (ACUTE KIDNEY INJURY): Status: ACTIVE | Noted: 2018-01-01

## 2018-04-02 PROBLEM — E03.9 HYPOTHYROIDISM: Status: ACTIVE | Noted: 2018-01-01

## 2018-04-02 PROBLEM — N18.30 ACUTE RENAL FAILURE SUPERIMPOSED ON STAGE 3 CHRONIC KIDNEY DISEASE: Status: ACTIVE | Noted: 2018-01-01

## 2018-04-02 PROBLEM — I35.1 MODERATE AORTIC REGURGITATION: Chronic | Status: ACTIVE | Noted: 2017-01-01

## 2018-04-02 PROBLEM — I35.0 MODERATE AORTIC STENOSIS: Chronic | Status: ACTIVE | Noted: 2017-01-01

## 2018-04-02 PROBLEM — D64.9 ANEMIA: Status: ACTIVE | Noted: 2018-01-01

## 2018-04-02 PROBLEM — R53.81 DEBILITY: Status: ACTIVE | Noted: 2018-01-01

## 2018-04-02 NOTE — ED NOTES
Pt given water and pt vomited water. Pt denies feeling nauseous but reported that water went down wrong way.

## 2018-04-02 NOTE — HPI
Mr. Domingo is a 89 year old white male with HTN, aortic stenosis, aortic regurgitation, hx of CVA, HLD, anemia, and diastolic dysfunction who presents today with generalized weakness. He has recently been evaluated for low BP by his PCP Dr. John Paul Sargent as he was complaining of faintness; recently reduced irbesartan from 300mg to 150mg daily. CBC was drawn with Hgb 8.7 and Mr. Domingo was given a hemoccult card; he brought it to clinic 4/2/18 where he was found to be weak and hypotensive and was sent to the ED. The pt denies any problems besides weakness, arthritic pains, and some intermittent abdominal pain in the epigastric and RLQ. The arthritic pain he treats with an OTC arthritis pain reliever, unknown what the active ingredient is. He does not remember seeing any black stools. He reports urinating as normal, good appetite, no syncope, headaches, diarrhea, constipation, n/v.   Per EMS his SBP was in the 80s-90s. In the ED SBP ranged . Hgb 9.5 (baseline 11.5), Hct 30.3. Cr 2.1 from baseline 1.3; BUN 54. He was given 1L NS and admitted to hospital medicine.  Mr. Domingo lives at home with his homar Katz; his other children live out of state. He is independent of ADLs and drives. His PCP is Dr. John Paul Sargent. He requests to be DNR.

## 2018-04-02 NOTE — SUBJECTIVE & OBJECTIVE
Past Medical History:   Diagnosis Date    Hypertension     Thyroid disease        Past Surgical History:   Procedure Laterality Date    TONSILLECTOMY         Review of patient's allergies indicates:  No Known Allergies    No current facility-administered medications on file prior to encounter.      Current Outpatient Prescriptions on File Prior to Encounter   Medication Sig    ALPRAZolam (XANAX) 0.25 MG tablet Take 0.5 tablets (0.125 mg total) by mouth nightly as needed for Anxiety.    clopidogrel (PLAVIX) 75 mg tablet Take 1 tablet (75 mg total) by mouth once daily.    cyanocobalamin (VITAMIN B-12) 1,000 mcg/mL injection 1 cc IM weekly for 4 weeks, then monthly    diltiaZEM (CARDIZEM CD) 120 MG Cp24 TAKE 1 CAPSULE TWICE DAILY    irbesartan (AVAPRO) 300 MG tablet Take 0.5 tablets (150 mg total) by mouth once daily.    ISTALOL 0.5 % DrpD     latanoprost 0.005 % ophthalmic solution     levothyroxine (SYNTHROID) 88 MCG tablet Take 1 tablet (88 mcg total) by mouth once daily.    simvastatin (ZOCOR) 40 MG tablet TAKE 1 TABLET NIGHTLY    hydrocortisone valerate (WEST-DINO) 0.2 % ointment Apply topically 2 (two) times daily. Prn itch     Family History     Problem Relation (Age of Onset)    No Known Problems Mother, Father        Social History Main Topics    Smoking status: Former Smoker     Types: Cigarettes     Quit date: 9/1/1966    Smokeless tobacco: Never Used    Alcohol use 1.2 oz/week     2 Glasses of wine per week    Drug use: No    Sexual activity: Not on file     Review of Systems   Constitutional: Positive for activity change and fatigue. Negative for appetite change, chills and fever.   HENT: Negative for congestion and rhinorrhea.    Eyes: Negative for photophobia and visual disturbance.   Respiratory: Negative for cough and shortness of breath.    Cardiovascular: Negative for chest pain and leg swelling.   Gastrointestinal: Positive for abdominal pain (epigastric through RLQ). Negative for  constipation, diarrhea, nausea and vomiting.   Endocrine: Negative for polydipsia and polyphagia.   Genitourinary: Negative for decreased urine volume and difficulty urinating.   Musculoskeletal: Positive for arthralgias and myalgias.   Skin: Negative for rash and wound.   Neurological: Positive for weakness and light-headedness. Negative for syncope and numbness.   Hematological: Negative for adenopathy. Bruises/bleeds easily.   Psychiatric/Behavioral: Negative for agitation and behavioral problems.     Objective:     Vital Signs (Most Recent):  Temp: 98.4 °F (36.9 °C) (04/02/18 1536)  Pulse: 100 (04/02/18 1800)  Resp: 20 (04/02/18 1800)  BP: (!) 99/55 (04/02/18 1800)  SpO2: 100 % (04/02/18 1800) Vital Signs (24h Range):  Temp:  [98.4 °F (36.9 °C)-98.8 °F (37.1 °C)] 98.4 °F (36.9 °C)  Pulse:  [] 100  Resp:  [18-25] 20  SpO2:  [85 %-100 %] 100 %  BP: ()/(34-81) 99/55        There is no height or weight on file to calculate BMI.    Physical Exam   Constitutional: He is sleeping. He is easily aroused. No distress. Nasal cannula in place.   HENT:   Head: Normocephalic and atraumatic.   Mouth/Throat: Mucous membranes are dry.   Eyes: Conjunctivae and EOM are normal. Pupils are equal, round, and reactive to light.   Cardiovascular: Normal rate and regular rhythm.    Murmur heard.  Pulmonary/Chest: Effort normal and breath sounds normal.   Abdominal: Soft. Bowel sounds are normal. There is tenderness (Pt calls it arthritis) in the right upper quadrant, right lower quadrant and epigastric area.   Neurological: He is easily aroused.   Skin: Skin is warm and dry. He is not diaphoretic. There is pallor.   Psychiatric: He has a normal mood and affect. His speech is normal and behavior is normal.         CRANIAL NERVES     CN III, IV, VI   Pupils are equal, round, and reactive to light.  Extraocular motions are normal.        Significant Labs:   CBC:   Recent Labs  Lab 04/02/18  1328   WBC 7.82   HGB 9.5*   HCT  30.3*        CMP:   Recent Labs  Lab 04/02/18  1328      K 5.0   *   CO2 21*   GLU 54*   BUN 54*   CREATININE 2.1*   CALCIUM 9.5   PROT 6.0   ALBUMIN 3.4*   BILITOT 0.4   ALKPHOS 83   AST 28   ALT 18   ANIONGAP 12   EGFRNONAA 26*     Cardiac Markers:   Recent Labs  Lab 04/02/18  1609   *     Coagulation:   Recent Labs  Lab 04/02/18  1328   INR 1.0       Significant Imaging:     CXR  Patient is rotated to the right.  Allowing for this heart size and pulmonary vascularity are normal.  The lungs are hypoexpanded.  Extensive vascular calcifications in the tortuous aorta.  No confluent consolidation or pneumothorax.  There is some blurring of the left hemidiaphragm which may represent a developing infiltrate.

## 2018-04-02 NOTE — ED PROVIDER NOTES
Encounter Date: 4/2/2018    SCRIBE #1 NOTE: I, Noe Carrasco, am scribing for, and in the presence of,  Dr. Carol Beaver. I have scribed the entire note.     I, Dr. Carol Beaver MD, personally performed the services described in this documentation. All medical record entries made by the scribe were at my direction and in my presence.  I have reviewed the chart and agree that the record reflects my personal performance and is accurate and complete. Carol Beaver MD.    History     Chief Complaint   Patient presents with    Fatigue     pt presents with a GI bleed for the past 3months. EMS reports initial BP was 92 systolic. 500mL of Nacl. denies chest pain or sob. been having black tarry stools for the past few months. reports always has low BP    GI Bleeding     CHIEF COMPLAINT: Patient presents with: fatigue, weakness and dark stools     HISTORY OF PRESENT ILLNESS: Mika Domingo who is a 89 y.o. presents to the emergency department today with complaint of feeling weak. Patient states that he has been having low blood pressure lately and that it is making him feel faint and weak.Patient states that he doesn't think that he has been having dark stool lately but admits that he is unsure. He has a h/o dark stools and anemia secondary to GI loss. When EMS picked him up he was hypotensive, SBP in the upper 80s to low 90s. The pt denies CP and SOB. No vomiting. No diarrhea.       ALLERGIES REVIEWED  MEDICATIONS REVIEWED  PMH/PSH/SOC/FH REVIEWED     The history is provided by the patient.    Nursing/Ancillary staff note reviewed.              Review of patient's allergies indicates:  No Known Allergies  Past Medical History:   Diagnosis Date    Hypertension     Thyroid disease      Past Surgical History:   Procedure Laterality Date    TONSILLECTOMY       Family History   Problem Relation Age of Onset    No Known Problems Mother     No Known Problems Father      Social History   Substance Use Topics    Smoking  status: Former Smoker     Types: Cigarettes     Quit date: 9/1/1966    Smokeless tobacco: Never Used    Alcohol use 1.2 oz/week     2 Glasses of wine per week     Review of Systems   Constitutional: Positive for fatigue. Negative for activity change, chills, diaphoresis and fever.   HENT: Negative for congestion, drooling, ear pain, rhinorrhea, sneezing, sore throat and trouble swallowing.    Eyes: Negative for pain.   Respiratory: Negative for cough, chest tightness, shortness of breath, wheezing and stridor.    Cardiovascular: Negative for chest pain, palpitations and leg swelling.   Gastrointestinal: Negative for abdominal distention, abdominal pain, constipation, diarrhea, nausea and vomiting.   Genitourinary: Negative for difficulty urinating, dysuria, frequency and urgency.   Musculoskeletal: Negative for arthralgias, back pain, myalgias, neck pain and neck stiffness.   Skin: Negative for pallor, rash and wound.   Neurological: Positive for dizziness and weakness. Negative for syncope, light-headedness, numbness and headaches.   All other systems reviewed and are negative.      Physical Exam     Initial Vitals   BP Pulse Resp Temp SpO2   -- -- -- -- --      MAP       --         04/02/18 1301 98.8 °F (37.1 °C) 101 20 94/71 -- -- -- 100 % nasal cannula -- CMK     04/02/18 1500 -- 99  25  86/51 -- -- -- 100 % nasal cannula -- CMK     04/02/18 1530 -- 103 20 112/81 -- -- -- 97 % nasal cannula -- CMK           Physical Exam    Nursing note and vitals reviewed.  Constitutional: He is not diaphoretic. No distress.   Elderly gentleman who appears fatigued.   HENT:   Head: Normocephalic and atraumatic.   Nose: Nose normal.   Mucus membranes dry.    Eyes: Conjunctivae and EOM are normal. Pupils are equal, round, and reactive to light. No scleral icterus.   Neck: Normal range of motion. Neck supple. No JVD present.   Cardiovascular: Normal rate and regular rhythm. Exam reveals no gallop and no friction rub.    No  murmur heard.  Systolic murmur.    Pulmonary/Chest: Breath sounds normal. No stridor. No respiratory distress. He has no wheezes. He exhibits no tenderness.   Abdominal: Soft. Bowel sounds are normal. He exhibits no distension and no mass. There is no tenderness. There is no rebound and no guarding.   Genitourinary:   Genitourinary Comments: Black stool in the rectal vault. No signs of tears or hemorrhoids.    Musculoskeletal: Normal range of motion. He exhibits no edema or tenderness.   Back NTTP.    Lymphadenopathy:     He has no cervical adenopathy.   Neurological: He is alert and oriented to person, place, and time. He has normal strength. No cranial nerve deficit.   Skin: Skin is warm and dry. No rash noted. No pallor.   Psychiatric: He has a normal mood and affect. Thought content normal.         ED Course   Procedures  Labs Reviewed   CBC W/ AUTO DIFFERENTIAL - Abnormal; Notable for the following:        Result Value    RBC 3.11 (*)     Hemoglobin 9.5 (*)     Hematocrit 30.3 (*)     MCHC 31.4 (*)     RDW 16.2 (*)     Gran% 84.0 (*)     Lymph% 9.0 (*)     Mono% 2.0 (*)     All other components within normal limits   COMPREHENSIVE METABOLIC PANEL - Abnormal; Notable for the following:     Chloride 111 (*)     CO2 21 (*)     Glucose 54 (*)     BUN, Bld 54 (*)     Creatinine 2.1 (*)     Albumin 3.4 (*)     eGFR if  30 (*)     eGFR if non  26 (*)     All other components within normal limits   OCCULT BLOOD X 1, STOOL - Abnormal; Notable for the following:     Occult Blood Positive (*)     All other components within normal limits   B-TYPE NATRIURETIC PEPTIDE - Abnormal; Notable for the following:      (*)     All other components within normal limits   TROPONIN I - Abnormal; Notable for the following:     Troponin I 0.030 (*)     All other components within normal limits   PROTIME-INR   B-TYPE NATRIURETIC PEPTIDE   TROPONIN I   TYPE & SCREEN     EKG Readings: (Independently  Interpreted)   98 BPM, normal sinus rhythm, normal axis normal intervals, no ST elevation, normal EKG.          Medical Decision Making:   History:   Old Medical Records: I decided to obtain old medical records.  Initial Assessment:   Patient presents to the ED today with complaints of a GI bleed for the last three months and weakness, so we will collect labs and a stool occult, then reassess.  Differential Diagnosis:   Differential diagnosis includes but is not limited to upper GI bleed, lower GI bleed, Electrolyte abnormality, hypoglycemia, CVA, spinal cord abnormality, infectious causes, Guillain Munden, neuromuscular junction disease, muscle disease, endocrine abnormalities, sepsis.  Independently Interpreted Test(s):   I have ordered and independently interpreted EKG Reading(s) - see prior notes  Clinical Tests:   Lab Tests: Ordered and Reviewed  Radiological Study: Ordered and Reviewed  Medical Tests: Ordered and Reviewed  ED Management:  3:30 PM  I took the pt off O2 because he is not home O2 dependant. He desats down to 84-85 % when off O2. I will obtain an Xray and add on a BNP to further assess.     5:03 PM  Pt has an elevated BNP and he still requires O2 supplementation. His BNP is elevated, his troponin is elevated, possibly from his chronic anemia vs CHF, no recent echo. He is dehydrated. He will need gentle fluid hydration and management of his hypoxia. I will discuss pt with Dr Felix.     I spoke with Dr Felix re the pts presentation and he accepts for admission, gentle hydration and monitoring of the pts hypoxia and lower GI bleed.     Other:   I have discussed this case with another health care provider.      I spent 32 minutes of critical care time with this patient.                     Clinical Impression:   The primary encounter diagnosis was Hypoxia. Diagnoses of Fatigue, Anemia, unspecified type, Lower GI bleed, and Dehydration were also pertinent to this visit.                            Carol Beaver MD  04/24/18 0499

## 2018-04-02 NOTE — ED NOTES
Pt presents to the ED w/ c/o of weakness and GI bleed for the past 3months. Pt denies chest pain or sob. Pt is orientedx4. Pt was given 500ml of NaCl by EMS prior to arrival. Pt reports that he normally has low BP. EMS reports BP was 92 systolic. Pt reports black tarry stools for the past 3months.

## 2018-04-03 NOTE — DISCHARGE SUMMARY
[facility] Hospital Medicine  Discharge Summary      Patient Name: Mika Domingo  MRN: [id number]  Admission Date: 2018  Hospital Length of Stay: 0 days  Discharge Date and Time: 2018 7:18 AM  Attending Physician: Chevy Felix MD   Discharging Provider: John Dupont MD  Primary Care Provider: John Paul Sargent MD      HPI:   Mr. Domingo is a 89 year old white male with HTN, aortic stenosis, aortic regurgitation, hx of CVA, HLD, anemia, and diastolic dysfunction who presents today with generalized weakness. He has recently been evaluated for low BP by his PCP Dr. John Paul Sargent as he was complaining of faintness; recently reduced irbesartan from 300mg to 150mg daily. CBC was drawn with Hgb 8.7 and Mr. Domingo was given a hemoccult card; he brought it to clinic 18 where he was found to be weak and hypotensive and was sent to the ED. The pt denies any problems besides weakness, arthritic pains, and some intermittent abdominal pain in the epigastric and RLQ. The arthritic pain he treats with an OTC arthritis pain reliever, unknown what the active ingredient is. He does not remember seeing any black stools. He reports urinating as normal, good appetite, no syncope, headaches, diarrhea, constipation, n/v.   Per EMS his SBP was in the 80s-90s. In the ED SBP ranged . Hgb 9.5 (baseline 11.5), Hct 30.3. Cr 2.1 from baseline 1.3; BUN 54. He was given 1L NS and admitted to hospital medicine.  Mr. Domingo lives at home with his homar Katz; his other children live out of state. He is independent of ADLs and drives. His PCP is Dr. John Paul Sargent. He requests to be DNR.       Hospital Course:   HPI above done by another healthcare provider.    He became bradycardiac, developed pulseless electrical activity, and  the next morning.  Telemetry showed asystole at 7:18 AM.  Nurses reported that he vomited after taking his morning medications and  peacefully.  His family was notified.  Labs  showed improvement of renal function.     Time of Death: 7:18 AM 4/3/18  Cause of Death: Cardiac arrest    Consults:   Consults         Status Ordering Provider     Gastroenterology  Once     Provider:  (Not yet assigned)    Acknowledged [facility]     IP consult to case management  Once     Provider:  (Not yet assigned)    Acknowledged [facility]        Final Active Diagnoses:    Diagnosis Date Noted POA    PRINCIPAL PROBLEM:  Acute renal failure superimposed on stage 3 chronic kidney disease [N17.9, N18.3] 2018 Yes    Debility [R53.81] 2018 Yes    Hypothyroidism [E03.9] 2018 Yes     Chronic    Hypotension due to drugs [I95.2] 2018 Yes    Essential hypertension [I10] 2017 Yes     Chronic    Diastolic dysfunction [I51.9] 2017 Yes     Chronic    Moderate aortic stenosis [I35.0] 2017 Yes     Chronic    Moderate aortic regurgitation [I35.1] 2017 Yes     Chronic    HLD (hyperlipidemia) [E78.5] 2016 Yes    History of CVA (cerebrovascular accident) [Z86.73] 2016 Not Applicable     Chronic    Vitamin B12 deficiency anemia [D51.9] 2015 Yes     Chronic      Problems Resolved During this Admission:    Diagnosis Date Noted Date Resolved POA       Discharged Condition:     Disposition:  in Medical Facil*    Patient Instructions:   No discharge procedures on file.    Significant Diagnostic Studies:     Recent Labs  Lab 18  1130 18  1328 18  2327   WBC 7.02 7.82 7.53   HGB 8.7* 9.5* 9.9*   HCT 29.1* 30.3* 31.8*    209 196       Recent Labs  Lab 18  1130 18  1328 18  0315    144 142   K 5.2* 5.0 5.2*    111* 110   CO2 27 21* 21*   BUN 41* 54* 32*   CREATININE 1.4 2.1* 1.8*   CALCIUM 9.0 9.5 8.8   PROT 6.0 6.0  --    BILITOT 0.3 0.4  --    ALKPHOS 91 83  --    ALT 13 18  --    AST 23 28  --      X-Ray Chest 1 View 18: Patient is rotated to the right.  Allowing for  this heart size and pulmonary vascularity are normal.  The lungs are hypoexpanded.  Extensive vascular calcifications in the tortuous aorta.  No confluent consolidation or pneumothorax.  There is some blurring of the left hemidiaphragm which may represent a developing infiltrate.    Indwelling Lines/Drains at time of discharge:   Lines/Drains/Airways          No matching active lines, drains, or airways          Time spent on the discharge of patient: 35 minutes  Patient was seen and examined on the date of discharge and determined to be suitable for discharge.         John Dupont MD  Department of Hospital Medicine  [facility]

## 2018-04-03 NOTE — H&P
[facility] Hospital Medicine  History & Physical    Patient Name: Mika Domingo  MRN: [id number]  Admission Date: 4/2/2018  Attending Physician: Carol Beaver MD   Primary Care Provider: John Paul Sargent MD         Patient information was obtained from patient, relative(s) and ER records.     Subjective:     Principal Problem:TRACY (acute kidney injury)    Chief Complaint:   Chief Complaint   Patient presents with    Fatigue     pt presents with a GI bleed for the past 3months. EMS reports initial BP was 92 systolic. 500mL of Nacl. denies chest pain or sob. been having black tarry stools for the past few months. reports always has low BP    GI Bleeding        HPI: Mr. Domingo is a 89 year old white male with HTN, aortic stenosis, aortic regurgitation, hx of CVA, HLD, anemia, and diastolic dysfunction who presents today with generalized weakness. He has recently been evaluated for low BP by his PCP Dr. John Paul Sargent as he was complaining of faintness; recently reduced irbesartan from 300mg to 150mg daily. CBC was drawn with Hgb 8.7 and Mr. Domingo was given a hemoccult card; he brought it to clinic 4/2/18 where he was found to be weak and hypotensive and was sent to the ED. The pt denies any problems besides weakness, arthritic pains, and some intermittent abdominal pain in the epigastric and RLQ. The arthritic pain he treats with an OTC arthritis pain reliever, unknown what the active ingredient is. He does not remember seeing any black stools. He reports urinating as normal, good appetite, no syncope, headaches, diarrhea, constipation, n/v.   Per EMS his SBP was in the 80s-90s. In the ED SBP ranged . Hgb 9.5 (baseline 11.5), Hct 30.3. Cr 2.1 from baseline 1.3; BUN 54. He was given 1L NS and admitted to hospital medicine.  Mr. Domingo lives at home with his homar Katz; his other children live out of state. He is independent of ADLs and drives. His PCP is Dr. John Paul Sargent. He requests to be DNR.      Past Medical History:   Diagnosis Date    Hypertension     Thyroid disease        Past Surgical History:   Procedure Laterality Date    TONSILLECTOMY         Review of patient's allergies indicates:  No Known Allergies    No current facility-administered medications on file prior to encounter.      Current Outpatient Prescriptions on File Prior to Encounter   Medication Sig    ALPRAZolam (XANAX) 0.25 MG tablet Take 0.5 tablets (0.125 mg total) by mouth nightly as needed for Anxiety.    clopidogrel (PLAVIX) 75 mg tablet Take 1 tablet (75 mg total) by mouth once daily.    cyanocobalamin (VITAMIN B-12) 1,000 mcg/mL injection 1 cc IM weekly for 4 weeks, then monthly    diltiaZEM (CARDIZEM CD) 120 MG Cp24 TAKE 1 CAPSULE TWICE DAILY    irbesartan (AVAPRO) 300 MG tablet Take 0.5 tablets (150 mg total) by mouth once daily.    ISTALOL 0.5 % DrpD     latanoprost 0.005 % ophthalmic solution     levothyroxine (SYNTHROID) 88 MCG tablet Take 1 tablet (88 mcg total) by mouth once daily.    simvastatin (ZOCOR) 40 MG tablet TAKE 1 TABLET NIGHTLY    hydrocortisone valerate (WEST-DINO) 0.2 % ointment Apply topically 2 (two) times daily. Prn itch     Family History     Problem Relation (Age of Onset)    No Known Problems Mother, Father        Social History Main Topics    Smoking status: Former Smoker     Types: Cigarettes     Quit date: 9/1/1966    Smokeless tobacco: Never Used    Alcohol use 1.2 oz/week     2 Glasses of wine per week    Drug use: No    Sexual activity: Not on file     Review of Systems   Constitutional: Positive for activity change and fatigue. Negative for appetite change, chills and fever.   HENT: Negative for congestion and rhinorrhea.    Eyes: Negative for photophobia and visual disturbance.   Respiratory: Negative for cough and shortness of breath.    Cardiovascular: Negative for chest pain and leg swelling.   Gastrointestinal: Positive for abdominal pain (epigastric through RLQ). Negative  for constipation, diarrhea, nausea and vomiting.   Endocrine: Negative for polydipsia and polyphagia.   Genitourinary: Negative for decreased urine volume and difficulty urinating.   Musculoskeletal: Positive for arthralgias and myalgias.   Skin: Negative for rash and wound.   Neurological: Positive for weakness and light-headedness. Negative for syncope and numbness.   Hematological: Negative for adenopathy. Bruises/bleeds easily.   Psychiatric/Behavioral: Negative for agitation and behavioral problems.     Objective:     Vital Signs (Most Recent):  Temp: 98.4 °F (36.9 °C) (04/02/18 1536)  Pulse: 100 (04/02/18 1800)  Resp: 20 (04/02/18 1800)  BP: (!) 99/55 (04/02/18 1800)  SpO2: 100 % (04/02/18 1800) Vital Signs (24h Range):  Temp:  [98.4 °F (36.9 °C)-98.8 °F (37.1 °C)] 98.4 °F (36.9 °C)  Pulse:  [] 100  Resp:  [18-25] 20  SpO2:  [85 %-100 %] 100 %  BP: ()/(34-81) 99/55        There is no height or weight on file to calculate BMI.    Physical Exam   Constitutional: He is sleeping. He is easily aroused. No distress. Nasal cannula in place.   HENT:   Head: Normocephalic and atraumatic.   Mouth/Throat: Mucous membranes are dry.   Eyes: Conjunctivae and EOM are normal. Pupils are equal, round, and reactive to light.   Cardiovascular: Normal rate and regular rhythm.    Murmur heard.  Pulmonary/Chest: Effort normal and breath sounds normal.   Abdominal: Soft. Bowel sounds are normal. There is tenderness (Pt calls it arthritis) in the right upper quadrant, right lower quadrant and epigastric area.   Neurological: He is easily aroused.   Skin: Skin is warm and dry. He is not diaphoretic. There is pallor.   Psychiatric: He has a normal mood and affect. His speech is normal and behavior is normal.         CRANIAL NERVES     CN III, IV, VI   Pupils are equal, round, and reactive to light.  Extraocular motions are normal.        Significant Labs:   CBC:   Recent Labs  Lab 04/02/18  1328   WBC 7.82   HGB 9.5*   HCT  30.3*        CMP:   Recent Labs  Lab 04/02/18  1328      K 5.0   *   CO2 21*   GLU 54*   BUN 54*   CREATININE 2.1*   CALCIUM 9.5   PROT 6.0   ALBUMIN 3.4*   BILITOT 0.4   ALKPHOS 83   AST 28   ALT 18   ANIONGAP 12   EGFRNONAA 26*     Cardiac Markers:   Recent Labs  Lab 04/02/18  1609   *     Coagulation:   Recent Labs  Lab 04/02/18  1328   INR 1.0       Significant Imaging:     CXR  Patient is rotated to the right.  Allowing for this heart size and pulmonary vascularity are normal.  The lungs are hypoexpanded.  Extensive vascular calcifications in the tortuous aorta.  No confluent consolidation or pneumothorax.  There is some blurring of the left hemidiaphragm which may represent a developing infiltrate.    Assessment/Plan:     * TRACY (acute kidney injury)    Anemia  Hypotension due to drugs  Pt with recent history of hypotension and possible GI bleed sx. Being evaluated by PCP, recently cut down ARB. Hgb 9.5 from baseline 11.5. Hemoccult positive with ER documented black stool. Pt takes unknown OTC arthritis pain medication. Adequate appetite but does not take much fluids. Cr 2.1 from 1.3, BUN 54 from 30.   IVF as needed. GI consult. Protonix 40mg IV daily.Telemetry monitoring. Holding ilbesartan in context of hypotension and TRACY. Will decrease diltiazem to 120mg daily.           Debility    History of CVA  Pt previously independent of ADLs and drives. Lives with stepson. Had one episode of vomiting after drinking water in Ed.  NPO, ice chips. ST swallow consult. PT/OT evaluate and treat.          Diastolic dysfunction    Moderate aortic regurgitation  Moderate aortic stenosis  Essential hypertension  Last echo in 2015 showing EF 60, diastolic dysfunction, AR and AS. , but does not appear to be overloaded at this time. HR .   Echo. Holding ilbesartan and decreasing diltiazem for now. Holding plavix.         Hypothyroidism    TSH 3/28 4.170.   Continue home synthroid 88 mcg.          HLD (hyperlipidemia)    Continue home simvastatin 40mg nightly.           VTE Risk Mitigation     None             Martha Cornell PA-C  Department of Hospital Medicine   [facility]

## 2018-04-03 NOTE — NURSING
Pt arrived via stretcher.  Appeared to be in no distress.  Pt very hard of hearing has Left ear hearing aid. Place aid in specimen cup w/pt label.  Pt also wears glasses; those placed at bed side.

## 2018-04-03 NOTE — PROGRESS NOTES
Hospital Medicine                                                                                       Death Note      Called to bedside by patient's nurse.   Patient is not responding to verbal or tactile stimuli. Patient does not have a papillary or corneal reflex. His pupils are fixed and dilated. No heart or breath sounds on auscultation. No respirations. No palpable pulses.     Time of death: 7:18 AM by telemetry.     Cause of Death: Cardiac arrest with preceding bradycardia and PEA

## 2018-04-03 NOTE — PLAN OF CARE
04/03/18 0826   Final Note   Assessment Type Final Discharge Note   Discharge Disposition Exp Medical     Sonya Blood RN  Transition Navigator  [phone]

## 2018-04-03 NOTE — PLAN OF CARE
Patient .     18 0825   Discharge Assessment   Assessment Type Discharge Planning Assessment     Sonya Blood RN  Transition Navigator  [phone]

## 2018-04-03 NOTE — HOSPITAL COURSE
HPI above done by another healthcare provider.    He became bradycardiac, developed pulseless electrical activity, and  the next morning.  Telemetry showed asystole at 7:18 AM.  Nurses reported that he vomited after taking his morning medications and  peacefully.  His family was notified.  Labs showed improvement of renal function.

## 2018-04-03 NOTE — CHAPLAIN
Patient , RN was present with him.  Step son visited.  Glasses and watch were given to step son.  Dentures were left in.  Patient will be picked up by [facility].

## 2018-04-03 NOTE — NURSING
Telemetry tech notified nurse of patient's heart rate radha-ing down to 32. Nurse responded promptly to rm to find patient agonal breathing with O2 on at 2l/nc. Attempted to arouse patient unsuccessfully with vigorous physical stimuli. Patient stopped breathing and heart monitor reading Asystole at 0718. Dr Dupont notified of patient's status. Notified Charge Nurse, OBINNA Zaidi who notified Nursing House Supervisor. Will cont to monitor patient.

## 2018-04-03 NOTE — NURSING
Continuing plan of care    Pt had a restful night, call for nothing and had no requests.     Tele: NSR, HR 70, no alarms.     Bed in lowest position, wheels locked, bed alarm set, call light and personal items with in reach, ID band and non slid socks worn.
